# Patient Record
(demographics unavailable — no encounter records)

---

## 2024-12-23 NOTE — DISCUSSION/SUMMARY
[de-identified] : I went over the pathophysiology of the patient's symptoms in great detail with the patient. I discussed the underlying pathophysiology of the patient's condition in great detail with the patient. I went over the patient's x-rays with them in great detail. The patient elected to receive a cortisone injection into her left knee today, and tolerated it well. I instructed the patient on ROM exercises, and told them to take it easy. The use of ice and rest was reviewed with the patient. The patient may resume activities tomorrow. Viscosupplementation was discussed as a solution to the patient's symptoms and a booklet with information was provided. I stressed the importance of weight loss and its benefits to the patients joints and overall health.   All of their questions were answered. They understand and consent to the plan.   FU in 4-6 weeks.

## 2024-12-23 NOTE — ADDENDUM
[FreeTextEntry1] : I, JOSEPHINE DIXON, acted solely as a scribe for Dr. Frandy Rocha on this date 12/23/2024.  All medical record entries made by the Scribe were at my, Dr. Frandy Rocha, direction and personally dictated by me on 12/23/2024. I have reviewed the chart and agree that the record accurately reflects my personal performance of the history, physical exam, assessment and plan. I have also personally directed, reviewed, and agreed with the chart.

## 2024-12-23 NOTE — PHYSICAL EXAM
[de-identified] : Constitutional o Appearance : well-nourished, well developed, alert, in no acute distress  Head and Face o Head :  Inspection : atraumatic, normocephalic o Face :  Inspection : no visible rash or discoloration Respiratory o Respiratory Effort: breathing unlabored  Neurologic o Mental Status Examination :  Orientation : alert and oriented X 3 Psychiatric o Mood and Affect: mood normal, affect appropriate Cardiovascular o Observation/Palpation : - no swelling Lymphatic o Additional Nodes : No palpable lymph nodes present  Right Lower Extremity o Buttock : no tenderness, swelling or deformities  o Right Hip :  Inspection/Palpation : no tenderness, swelling or deformities  Range of Motion : full and painless in all planes, no crepitance  Stability : joint stability intact  Strength : extension, flexion, adduction, abduction, internal rotation and external rotation 5/5   o Right Knee :  Inspection/Palpation : minimal medial compartment  tenderness to palpation, no swelling  Range of Motion : active flexion and extension full and painless, no crepitance  Stability : no valgus or varus instability present on provocative testing  Strength : flexion and extension 5/5  Tests and Signs : negative Anterior Drawer, negative Lachman, negative Marty  Left Lower Extremity o Buttock : no tenderness, swelling or deformities  o Left Hip :  Inspection/Palpation : no tenderness, no swelling or deformities  Range of Motion : full and painless in all planes, no crepitance  Stability : joint stability intact  Strength : extension, flexion, adduction, abduction, internal rotation and external rotation 5/5  o Left Knee :  Inspection/Palpation : severe medial compartment tenderness no lateral tenderness to palpation, no swelling  Range of Motion : 0-130 active flexion and extension full and painless, no crepitance, decreased patellofemoral glide   Stability : no valgus or varus instability present on provocative testing  Strength : flexion and extension 5/5  Tests and Signs : negative Anterior Drawer, negative Lachman, negative Marty  Gait and Station: Gait: mild valgus deformity, no significant extremity swelling or lymphedema, good proprioception and balance  Radiology Results 12/23/2024 o Left Knee : Standing AP, lateral, tunnel, and skyline views of the knee were obtained and reveal mild medial and moderate to severe patellofemoral arthritis   o Knee injection : Indication- left knee osteoarthritis, Anatomic location- left intra-articular joint space, Spray - area was sterilized with Betadine and alcohol and anesthetized with Ethyl Chloride , needle used-20G, Medications given- 5cc's lidocaine, 0.5cc's kenalog, 0.5 cc's dexamethasone. The patient tolerated the procedure well.

## 2024-12-23 NOTE — HISTORY OF PRESENT ILLNESS
[de-identified] : 66 year old female presents complaining of left knee pain that started a few weeks ago. The patient cannot attribute her pain to any injury, fall, or trauma. She states the left knee pain is localized on the medial aspect. She denies any swelling or buckling. She states the left knee feels warm from time to time. Patient states that she has difficulty when ascending and descending stairs. Patient denies taking any medication to manage her pain. She denies that the pain wakes her from sleep. She denies right knee pain. She denies prior injections and surgeries.

## 2025-01-29 NOTE — ADDENDUM
[FreeTextEntry1] : I, JOSEPHINE DIXON, acted solely as a scribe for Dr. Frandy Rocha on this date 01/29/2025.  All medical record entries made by the Scribe were at my, Dr. Frandy Rocha, direction and personally dictated by me on 01/29/2025. I have reviewed the chart and agree that the record accurately reflects my personal performance of the history, physical exam, assessment and plan. I have also personally directed, reviewed, and agreed with the chart.

## 2025-01-29 NOTE — HISTORY OF PRESENT ILLNESS
[de-identified] : 66 year old female presents for a follow-up of left knee pain that started a few weeks ago. The patient s/p left knee cortisone injection on 12/23/2024 and reports its still working with dramatic relief. She denies any swelling or buckling. She states she is able to walk up and down steps with minimal discomfort. She does not have difficulty getting up from a seated position. Patient denies taking any medication to manage her pain. She denies that the pain wakes her from sleep.   Radiology Results 12/23/2024 o Left Knee : Standing AP, lateral, tunnel, and skyline views of the knee were obtained and reveal mild medial and moderate to severe patellofemoral arthritis

## 2025-01-29 NOTE — PHYSICAL EXAM
[de-identified] : Constitutional o Appearance : well-nourished, well developed, alert, in no acute distress  Head and Face o Head :  Inspection : atraumatic, normocephalic o Face :  Inspection : no visible rash or discoloration Respiratory o Respiratory Effort: breathing unlabored  Neurologic o Mental Status Examination :  Orientation : alert and oriented X 3 Psychiatric o Mood and Affect: mood normal, affect appropriate Cardiovascular o Observation/Palpation : - no swelling Lymphatic o Additional Nodes : No palpable lymph nodes present  Right Lower Extremity o Buttock : no tenderness, swelling or deformities  o Right Hip :  Inspection/Palpation : no tenderness, swelling or deformities  Range of Motion : full and painless in all planes, no crepitance  Stability : joint stability intact  Strength : extension, flexion, adduction, abduction, internal rotation and external rotation 5/5   o Right Knee :  Inspection/Palpation : minimal medial compartment  tenderness to palpation, no swelling  Range of Motion : active flexion and extension full and painless, no crepitance  Stability : no valgus or varus instability present on provocative testing  Strength : flexion and extension 5/5  Tests and Signs : negative Anterior Drawer, negative Lachman, negative Marty  Left Lower Extremity o Buttock : no tenderness, swelling or deformities  o Left Hip :  Inspection/Palpation : no tenderness, no swelling or deformities  Range of Motion : full and painless in all planes, no crepitance  Stability : joint stability intact  Strength : extension, flexion, adduction, abduction, internal rotation and external rotation 5/5  o Left Knee :  Inspection/Palpation : severe medial compartment tenderness no lateral tenderness to palpation, no swelling  Range of Motion : 0-125 active flexion and extension full and painless, no crepitance, decreased patellofemoral glide   Stability : no valgus or varus instability present on provocative testing  Strength : flexion and extension 5/5  Tests and Signs : negative Anterior Drawer, negative Lachman, negative Marty  Gait and Station: Gait: mild valgus deformity, no significant extremity swelling or lymphedema, good proprioception and balance

## 2025-01-29 NOTE — DISCUSSION/SUMMARY
[de-identified] : I went over the pathophysiology of the patient's symptoms in great detail with the patient. I discussed the underlying pathophysiology of the patient's condition in great detail with the patient. I went over the patient's x-rays with them in great detail. I stressed the importance of weight loss and its benefits to the patients joints and overall health. Viscosupplementation was discussed as a solution to the patient's symptoms and a booklet with information was provided. We discussed the use of ice, Tylenol and anti-inflammatories to relieve pain.   All of their questions were answered. They understand and consent to the plan.   FU as needed.  If her symptoms return and she needs another cortisone shot Visco supplementation will be